# Patient Record
Sex: FEMALE | Race: WHITE | Employment: FULL TIME | URBAN - NONMETROPOLITAN AREA
[De-identification: names, ages, dates, MRNs, and addresses within clinical notes are randomized per-mention and may not be internally consistent; named-entity substitution may affect disease eponyms.]

---

## 2021-04-26 ENCOUNTER — TELEPHONE (OUTPATIENT)
Dept: OBGYN CLINIC | Age: 56
End: 2021-04-26

## 2021-04-26 ENCOUNTER — HOSPITAL ENCOUNTER (OUTPATIENT)
Age: 56
Setting detail: SPECIMEN
Discharge: HOME | End: 2021-04-26
Payer: COMMERCIAL

## 2021-04-26 ENCOUNTER — OFFICE VISIT (OUTPATIENT)
Dept: OBGYN CLINIC | Age: 56
End: 2021-04-26
Payer: COMMERCIAL

## 2021-04-26 VITALS
SYSTOLIC BLOOD PRESSURE: 140 MMHG | BODY MASS INDEX: 36.62 KG/M2 | WEIGHT: 199 LBS | DIASTOLIC BLOOD PRESSURE: 80 MMHG | HEIGHT: 62 IN

## 2021-04-26 DIAGNOSIS — N93.9 EPISODE OF HEAVY VAGINAL BLEEDING: ICD-10-CM

## 2021-04-26 DIAGNOSIS — N93.9 ABNORMAL UTERINE BLEEDING: Primary | ICD-10-CM

## 2021-04-26 DIAGNOSIS — N93.9 ABNORMAL UTERINE BLEEDING: ICD-10-CM

## 2021-04-26 LAB
ABSOLUTE EOS #: 0.37 K/UL (ref 0–0.44)
ABSOLUTE IMMATURE GRANULOCYTE: 0.03 K/UL (ref 0–0.3)
ABSOLUTE LYMPH #: 2.22 K/UL (ref 1.1–3.7)
ABSOLUTE MONO #: 0.79 K/UL (ref 0.1–1.2)
BASOPHILS # BLD: 1 % (ref 0–2)
BASOPHILS ABSOLUTE: 0.12 K/UL (ref 0–0.2)
DIFFERENTIAL TYPE: NORMAL
EOSINOPHILS RELATIVE PERCENT: 4 % (ref 1–4)
ESTRADIOL LEVEL: 30 PG/ML (ref 5–50)
FOLLICLE STIMULATING HORMONE: 17.2 U/L (ref 25.8–134.8)
HCT VFR BLD CALC: 43.5 % (ref 36.3–47.1)
HEMOGLOBIN: 14.2 G/DL (ref 11.9–15.1)
IMMATURE GRANULOCYTES: 0 %
LYMPHOCYTES # BLD: 26 % (ref 24–43)
MCH RBC QN AUTO: 29.8 PG (ref 25.2–33.5)
MCHC RBC AUTO-ENTMCNC: 32.6 G/DL (ref 28.4–34.8)
MCV RBC AUTO: 91.4 FL (ref 82.6–102.9)
MONOCYTES # BLD: 9 % (ref 3–12)
NRBC AUTOMATED: 0 PER 100 WBC
PDW BLD-RTO: 13.4 % (ref 11.8–14.4)
PLATELET # BLD: 365 K/UL (ref 138–453)
PLATELET ESTIMATE: NORMAL
PMV BLD AUTO: 9.7 FL (ref 8.1–13.5)
RBC # BLD: 4.76 M/UL (ref 3.95–5.11)
RBC # BLD: NORMAL 10*6/UL
SEG NEUTROPHILS: 60 % (ref 36–65)
SEGMENTED NEUTROPHILS ABSOLUTE COUNT: 5.06 K/UL (ref 1.5–8.1)
WBC # BLD: 8.6 K/UL (ref 3.5–11.3)
WBC # BLD: NORMAL 10*3/UL

## 2021-04-26 PROCEDURE — 58100 BIOPSY OF UTERUS LINING: CPT | Performed by: ADVANCED PRACTICE MIDWIFE

## 2021-04-26 PROCEDURE — 99214 OFFICE O/P EST MOD 30 MIN: CPT | Performed by: ADVANCED PRACTICE MIDWIFE

## 2021-04-26 ASSESSMENT — ENCOUNTER SYMPTOMS
RESPIRATORY NEGATIVE: 1
BACK PAIN: 1
ABDOMINAL PAIN: 1

## 2021-04-27 ENCOUNTER — OFFICE VISIT (OUTPATIENT)
Dept: OBGYN CLINIC | Age: 56
End: 2021-04-27
Payer: COMMERCIAL

## 2021-04-27 VITALS
DIASTOLIC BLOOD PRESSURE: 80 MMHG | WEIGHT: 199 LBS | BODY MASS INDEX: 36.62 KG/M2 | HEIGHT: 62 IN | SYSTOLIC BLOOD PRESSURE: 120 MMHG

## 2021-04-27 DIAGNOSIS — N93.9 ABNORMAL UTERINE AND VAGINAL BLEEDING, UNSPECIFIED: Primary | ICD-10-CM

## 2021-04-27 PROCEDURE — 99213 OFFICE O/P EST LOW 20 MIN: CPT | Performed by: ADVANCED PRACTICE MIDWIFE

## 2021-04-27 NOTE — PROGRESS NOTES
PROBLEM VISIT     Date of service: 2021    Indy Jimenez  Is a 63 y.o.  female    PT's PCP is: No primary care provider on file. : 1965                                          Review of Systems  Unchanged from yesterday 21 visit aside from vaginal bleeding has greatly improved and is scant now. No LMP recorded. OB History    Para Term  AB Living   2             SAB TAB Ectopic Molar Multiple Live Births             2      # Outcome Date GA Lbr Raciel/2nd Weight Sex Delivery Anes PTL Lv   2             1                  Social History     Tobacco Use   Smoking Status Never Smoker   Smokeless Tobacco Never Used        Social History     Substance and Sexual Activity   Alcohol Use Yes    Alcohol/week: 5.0 standard drinks    Types: 5 Glasses of wine per week       Allergies: Patient has no known allergies. Current Outpatient Medications:     norethindrone (AYGESTIN) 5 MG tablet, Take 2 tabs PO every hr until spotting (max of 6 doses) Then 2 tabs PO TID x2 days, 2 tabs PO BID x2 days, 2 tabs PO daily x 16 days, Disp: 64 tablet, Rfl: 0    Social History     Substance and Sexual Activity   Sexual Activity Yes    Partners: Male       Chief Complaint   Patient presents with    Follow-up     [facility] f/u for AUB. Pt denies any new concerns. Physical Exam  Constitutional:       Appearance: Normal appearance. She is normal weight. HENT:      Head: Normocephalic. Eyes:      Pupils: Pupils are equal, round, and reactive to light. Neck:      Musculoskeletal: Normal range of motion. Pulmonary:      Effort: Pulmonary effort is normal.   Musculoskeletal: Normal range of motion. Neurological:      Mental Status: She is alert and oriented to person, place, and time. Skin:     General: Skin is warm and dry. Psychiatric:         Behavior: Behavior normal.   Vitals signs and nursing note reviewed.          Vital Signs  Blood pressure 120/80, height 5'

## 2021-04-28 LAB — SURGICAL PATHOLOGY REPORT: NORMAL

## 2021-04-29 LAB
HPV SAMPLE: NORMAL
HPV, GENOTYPE 16: NOT DETECTED
HPV, GENOTYPE 18: NOT DETECTED
HPV, HIGH RISK OTHER: NOT DETECTED
HPV, INTERPRETATION: NORMAL
SPECIMEN DESCRIPTION: NORMAL

## 2021-04-30 LAB — CYTOLOGY REPORT: NORMAL

## 2021-05-25 ENCOUNTER — TELEPHONE (OUTPATIENT)
Dept: OBGYN CLINIC | Age: 56
End: 2021-05-25

## 2021-05-25 NOTE — TELEPHONE ENCOUNTER
Okay for another aygestin script. At last visit with me we had discussed that this was not PMB based on lab results - if normal biopsy and pap smear patient was going to use Aygestin and if bleeding returned was going to consider hyst due to fam hx of gyn cancer. Does not necessarily need appt early in June with  me - would she like to proceed with hysterectomy as we had discussed - if so then please schedule.

## 2021-05-25 NOTE — TELEPHONE ENCOUNTER
Spoke to patient and reviewed Teri's recommendations. Rx for Aygsestin e-prescribed to [facility] on 6th. She would like to keep her visit on 6/7 as she is also needing a repeat pap due to most recent pap being obscured by blood. She is wanting to discuss all of her options. She was leaning towards a hyst when all this started as she was concerned that she had cancer. She is reassured that her bx was normal and unsure of what she would like to do for the next step.

## 2021-06-07 ENCOUNTER — OFFICE VISIT (OUTPATIENT)
Dept: OBGYN CLINIC | Age: 56
End: 2021-06-07
Payer: COMMERCIAL

## 2021-06-07 ENCOUNTER — TELEPHONE (OUTPATIENT)
Dept: OBGYN CLINIC | Age: 56
End: 2021-06-07

## 2021-06-07 VITALS
BODY MASS INDEX: 37.1 KG/M2 | DIASTOLIC BLOOD PRESSURE: 80 MMHG | WEIGHT: 201.6 LBS | HEIGHT: 62 IN | SYSTOLIC BLOOD PRESSURE: 130 MMHG

## 2021-06-07 DIAGNOSIS — R87.615 PAP SMEAR OF CERVIX UNSATISFACTORY: ICD-10-CM

## 2021-06-07 DIAGNOSIS — Z01.419 SMEAR, VAGINAL, AS PART OF ROUTINE GYNECOLOGICAL EXAMINATION: Primary | ICD-10-CM

## 2021-06-07 DIAGNOSIS — Z12.72 SMEAR, VAGINAL, AS PART OF ROUTINE GYNECOLOGICAL EXAMINATION: Primary | ICD-10-CM

## 2021-06-07 PROCEDURE — 99396 PREV VISIT EST AGE 40-64: CPT | Performed by: ADVANCED PRACTICE MIDWIFE

## 2021-06-07 ASSESSMENT — ENCOUNTER SYMPTOMS
SHORTNESS OF BREATH: 0
RESPIRATORY NEGATIVE: 1
ABDOMINAL PAIN: 0
GASTROINTESTINAL NEGATIVE: 1
CONSTIPATION: 0
DIARRHEA: 0

## 2021-06-07 NOTE — PROGRESS NOTES
YEARLY PHYSICAL    Date of service: 2021    Genoveva Emanuel  Is a 63 y.o.   female    PT's PCP is: Naman Johnson     : 1965                                             Subjective:       Patient's last menstrual period was 2021 (approximate). Are your menses regular: Has been w/u for AUB and prolonged - improved with Aygestin but not a long term mgmt     OB History    Para Term  AB Living   2             SAB TAB Ectopic Molar Multiple Live Births             2      # Outcome Date GA Lbr Raciel/2nd Weight Sex Delivery Anes PTL Lv   2             1                  Social History     Tobacco Use   Smoking Status Never Smoker   Smokeless Tobacco Never Used        Social History     Substance and Sexual Activity   Alcohol Use Yes    Alcohol/week: 5.0 standard drinks    Types: 5 Glasses of wine per week       Family History   Problem Relation Age of Onset    Heart Disease Paternal Grandfather     Uterine Cancer Maternal Grandmother     Heart Disease Father        Any family history of breast or ovarian cancer: No    Any family history of blood clots: No      Allergies: Patient has no known allergies.       Current Outpatient Medications:     norethindrone (AYGESTIN) 5 MG tablet, Take 2 tabs PO every hr until spotting (max of 6 doses) Then 2 tabs PO TID x2 days, 2 tabs PO BID x2 days, 2 tabs PO daily x 16 days, Disp: 64 tablet, Rfl: 0    Social History     Substance and Sexual Activity   Sexual Activity Yes    Partners: Male       Any bleeding or pain with intercourse: No    Last pap: 2021 Unsat due to heavy bleeding    Last Mammogram: >12 months    Last Dexascan n/a    Last colorectal screen- type: Planning to have cologard with PCP    Do you do self breast exams: Yes    Past Medical History:   Diagnosis Date    Endometriosis        Past Surgical History:   Procedure Laterality Date    TONSILLECTOMY         Family History   Problem Relation Age of Onset    Heart Disease Paternal Grandfather     Uterine Cancer Maternal Grandmother     Heart Disease Father        Chief Complaint   Patient presents with    Follow-up     Pt here for repeat pap.  Vaginal Bleeding     Pt states when taking aygesting does not have bleeding but without them has bleeding. Labs:    No results found for this visit on 06/07/21. HPI: Denies breast/pelvic concerns aside from menses changes - ready to discuss long term mgmt. Monogamous relationship. Needs repeat pap    Review of Systems   Constitutional: Negative. Negative for chills, fatigue and fever. HENT: Negative. Respiratory: Negative. Negative for shortness of breath. Cardiovascular: Negative. Negative for chest pain. Gastrointestinal: Negative. Negative for abdominal pain, constipation and diarrhea. Genitourinary: Positive for menstrual problem. Negative for dysuria, enuresis, frequency, pelvic pain, urgency and vaginal bleeding. Musculoskeletal: Negative. Neurological: Negative. Negative for dizziness, light-headedness and headaches. Psychiatric/Behavioral: Negative. Objective  Blood pressure (!) 140/84, height 5' 2\" (1.575 m), weight 201 lb 9.6 oz (91.4 kg), last menstrual period 05/21/2021. Physical Exam  Constitutional:       Appearance: Normal appearance. She is normal weight. Genitourinary:      Pelvic exam was performed with patient in the lithotomy position. Vulva, urethra, vagina, cervix, uterus, right adnexa and left adnexa normal.      Uterus is anteverted and regular. HENT:      Head: Normocephalic. Eyes:      Pupils: Pupils are equal, round, and reactive to light. Cardiovascular:      Rate and Rhythm: Normal rate and regular rhythm. Pulses: Normal pulses. Heart sounds: Normal heart sounds. No murmur heard.      Pulmonary:      Effort: Pulmonary effort is normal.      Breath sounds: Normal breath sounds. No wheezing. Chest:      Breasts:         Right: Normal.         Left: Normal.      Comments: Dense breast tissue bilaterally and pendulous breasts  Abdominal:      Palpations: Abdomen is soft. Tenderness: There is no abdominal tenderness. Musculoskeletal:         General: Normal range of motion. Cervical back: Normal range of motion. No muscular tenderness. Neurological:      Mental Status: She is alert and oriented to person, place, and time. Skin:     General: Skin is warm and dry. Psychiatric:         Behavior: Behavior normal.   Vitals and nursing note reviewed. Chaperone present: Declined. Assessment and Plan          Diagnosis Orders   1. Smear, vaginal, as part of routine gynecological examination  PAP SMEAR   2. Pap smear of cervix unsatisfactory  PAP SMEAR     Repeat Annual every 1 year  Cervical Cytology Evaluation begins at 24years old. If Negative Cytology, Follow-up screening per current guidelines. Mammograms every 1year. If 37 yo and last mammogram was negative. Calcium and Vitamin D dosing reviewed. Colonoscopy screening reviewed as well as onset for bone density testing. Birth control and barrier recommendationsdiscussed. STD counseling and prevention reviewed. Gardisil counseling completed for all patients. Routine healthmaintenance per patients PCP. Discussed options for AUB - patient prefers ablation at this time, declines hyst.          I am having Milagros Copper maintain her norethindrone. Return in about 1 year (around 6/7/2022) for Yearly. She was also counseled on her preventative health maintenance recommendations and follow-up. There are no Patient Instructions on file for this visit.     MURRAY Duque CNM,6/7/2021 4:54 PM

## 2021-06-08 ENCOUNTER — HOSPITAL ENCOUNTER (OUTPATIENT)
Age: 56
Setting detail: SPECIMEN
Discharge: HOME | End: 2021-06-08
Payer: COMMERCIAL

## 2021-06-10 LAB — CYTOLOGY REPORT: NORMAL

## 2021-06-10 NOTE — TELEPHONE ENCOUNTER
Spoke to patient to schedule her procedure, we discussed surgery expectations and recovery. We discussed available dates, she needs to look at her calendar and will call with her decision. 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

## 2021-06-14 DIAGNOSIS — N93.9 EPISODE OF HEAVY VAGINAL BLEEDING: ICD-10-CM

## 2021-06-14 DIAGNOSIS — Z01.818 PRE-OP TESTING: ICD-10-CM

## 2021-06-14 DIAGNOSIS — N92.1 EXCESSIVE, FREQUENT AND IRREGULAR MENSTRUATION: ICD-10-CM

## 2021-06-14 DIAGNOSIS — N93.9 ABNORMAL UTERINE AND VAGINAL BLEEDING, UNSPECIFIED: Primary | ICD-10-CM

## 2021-06-14 NOTE — TELEPHONE ENCOUNTER
I attempted to call patient to confirm surgery details and had to leave a message. She is scheduled for a Hysteroscopy D&C, Thierry at [facility] on 7/23/21. She is aware that a nurse from [facility] will call her to complete a phone interview and arrange COVID testing. She will let me know if she needs a note for work. Patient will come in to see Dr. Ferdinand Aguilar prior to surgery and will get labs at that visit. She will also need an EKG and order is sent to [facility]. We will also follow up 4-6 weeks after surgery. Appointments scheduled. Patient verbalized understanding, no further questions/concerns voiced.

## 2021-06-14 NOTE — TELEPHONE ENCOUNTER
Patient left a message on the voicemail that she would like to go ahead with surgery on 7/23. Paperwork faxed, will await confirmation.

## 2021-06-24 ENCOUNTER — TELEPHONE (OUTPATIENT)
Dept: OBGYN CLINIC | Age: 56
End: 2021-06-24

## 2021-06-24 DIAGNOSIS — N93.9 ABNORMAL UTERINE AND VAGINAL BLEEDING, UNSPECIFIED: Primary | ICD-10-CM

## 2021-06-24 NOTE — TELEPHONE ENCOUNTER
Patient left a message on the voicemail that she finished her Aygestin a few days ago and has now started having heavy bleeding again. She is requesting another refill of Aygestin to last until her 7/23 surgery. Ok to call in another refill?

## 2021-06-24 NOTE — TELEPHONE ENCOUNTER
Spoke to patient, she is aware that the Rx was sent to the pharmacy. She was bleeding very heavy today and today is a little lighter. She may hold a day before starting the medication. She is aware that if she is still finishing up the Aygestin at the time of surgery or on her cycle, it will not alter her cycle. Patient verbalized understanding, no further questions/concerns voiced.

## 2021-07-07 ENCOUNTER — TELEPHONE (OUTPATIENT)
Dept: OBGYN CLINIC | Age: 56
End: 2021-07-07

## 2021-07-07 NOTE — TELEPHONE ENCOUNTER
Spoke to [place] at [place] regarding patient's upcoming procedure ([id number], [id number]). The procedure has been approved with a date range of 7/7/21-7/23/21 and auth number is [id number].

## 2021-07-21 ENCOUNTER — OFFICE VISIT (OUTPATIENT)
Dept: OBGYN CLINIC | Age: 56
End: 2021-07-21
Payer: COMMERCIAL

## 2021-07-21 ENCOUNTER — HOSPITAL ENCOUNTER (OUTPATIENT)
Age: 56
Setting detail: SPECIMEN
Discharge: HOME | End: 2021-07-21
Payer: COMMERCIAL

## 2021-07-21 VITALS
BODY MASS INDEX: 36.8 KG/M2 | WEIGHT: 200 LBS | SYSTOLIC BLOOD PRESSURE: 128 MMHG | DIASTOLIC BLOOD PRESSURE: 88 MMHG | HEIGHT: 62 IN

## 2021-07-21 DIAGNOSIS — N93.9 ABNORMAL UTERINE AND VAGINAL BLEEDING, UNSPECIFIED: ICD-10-CM

## 2021-07-21 DIAGNOSIS — N93.9 EPISODE OF HEAVY VAGINAL BLEEDING: ICD-10-CM

## 2021-07-21 DIAGNOSIS — N92.1 EXCESSIVE, FREQUENT AND IRREGULAR MENSTRUATION: ICD-10-CM

## 2021-07-21 DIAGNOSIS — Z01.818 PRE-OP TESTING: ICD-10-CM

## 2021-07-21 DIAGNOSIS — N92.1 MENORRHAGIA WITH IRREGULAR CYCLE: Primary | ICD-10-CM

## 2021-07-21 PROCEDURE — 99213 OFFICE O/P EST LOW 20 MIN: CPT | Performed by: OBSTETRICS & GYNECOLOGY

## 2021-07-21 PROCEDURE — 93000 ELECTROCARDIOGRAM COMPLETE: CPT | Performed by: OBSTETRICS & GYNECOLOGY

## 2021-07-21 NOTE — PROGRESS NOTES
DATE OF VISIT:  7/21/21    PATIENT NAME:  Sushila Weaver     YOB: 1965    REASON FOR VISIT:    Chief Complaint   Patient presents with    Pre-op Exam     pt. is scheduled on 7- for hyst D&C, novasure ablation.  Irregular Menses     Pt. is using aygestin to supress her cycle. Prior to that she was saturating  a pad an hour. HISTORY OF PRESENT ILLNESS:  Pt was having heavy menses this spring and previously had thought she was menopausal - labs showed that she is not - using aygestin to control bleeding now - wants surgical intervention; disc'd hysteroscopy d&c novasure r/b/a disc'd consent obtained      No LMP recorded (approximate). Vitals:    07/21/21 1438   BP: 128/88   Site: Right Upper Arm   Position: Sitting   Weight: 200 lb (90.7 kg)   Height: 5' 2\" (1.575 m)     Body mass index is 36.58 kg/m². Allergies   Allergen Reactions    Tree Nut [Macadamia Nut Oil]      nithin     Current Outpatient Medications   Medication Sig Dispense Refill    norethindrone (AYGESTIN) 5 MG tablet Take 2 tabs PO every hr until spotting (max of 6 doses) Then 2 tabs PO TID x2 days, 2 tabs PO BID x2 days, 2 tabs PO daily x 16 days 64 tablet 0     No current facility-administered medications for this visit. Social History     Socioeconomic History    Marital status: Single     Spouse name: Not on file    Number of children: Not on file    Years of education: Not on file    Highest education level: Not on file   Occupational History    Not on file   Tobacco Use    Smoking status: Never Smoker    Smokeless tobacco: Never Used   Vaping Use    Vaping Use: Never used   Substance and Sexual Activity    Alcohol use:  Yes     Alcohol/week: 5.0 standard drinks     Types: 5 Glasses of wine per week    Drug use: Never    Sexual activity: Yes     Partners: Male   Other Topics Concern    Not on file   Social History Narrative    Not on file     Social Determinants of Health     Financial Resource Strain:     Difficulty of Paying Living Expenses:    Food Insecurity:     Worried About Running Out of Food in the Last Year:     [address] in the Last Year:    Transportation Needs:     Lack of Transportation (Medical):  Lack of Transportation (Non-Medical):    Physical Activity:     Days of Exercise per Week:     Minutes of Exercise per Session:    Stress:     Feeling of Stress :    Social Connections:     Frequency of Communication with Friends and Family:     Frequency of Social Gatherings with Friends and Family:     Attends Caoda[employer] Services:     Active Member of Clubs or Organizations:     Attends Club or Organization Meetings:     Marital Status:    Intimate Partner Violence:     Fear of Current or Ex-Partner:     Emotionally Abused:     Physically Abused:     Sexually Abused:        REVIEW OF SYSTEMS:  Review of Systems   Constitutional: Negative for chills and fever. Genitourinary: Positive for menstrual problem. Negative for pelvic pain and vaginal discharge. PHYSICAL EXAM:  /88 (Site: Right Upper Arm, Position: Sitting)   Ht 5' 2\" (1.575 m)   Wt 200 lb (90.7 kg)   LMP  (Approximate) Comment: LMP approx 1 month ago. She is on aygestin to supress  BMI 36.58 kg/m²   Physical Exam  Constitutional:       Appearance: Normal appearance. HENT:      Head: Normocephalic and atraumatic. Eyes:      Extraocular Movements: Extraocular movements intact. Pupils: Pupils are equal, round, and reactive to light. Cardiovascular:      Rate and Rhythm: Normal rate. Pulmonary:      Effort: Pulmonary effort is normal.   Musculoskeletal:         General: Normal range of motion. Cervical back: Normal range of motion. Neurological:      Mental Status: She is alert and oriented to person, place, and time. Skin:     General: Skin is warm and dry.    Psychiatric:         Mood and Affect: Mood normal.         Behavior: Behavior normal.         Thought Content: Thought content normal.         Judgment: Judgment normal.     The patient, Sushila Weaver is a 63 y.o. female, was seen with a total time spent of 20 minutes for the visit on this date of service by the E/M provider. The time component had both face to face and non face to face time spent in determining the total time component. Counseling and education regarding her diagnosis listed below and her options regarding those diagnoses were also included in determining her time component. Diagnosis Orders   1. Menorrhagia with irregular cycle          The patient had her preventative health maintenance recommendations and follow-up reviewed with her at the completion of her visit. ASSESSMENT:      1. Menorrhagia with irregular cycle        PLAN:  No orders of the defined types were placed in this encounter. Return in about 1 week (around 7/28/2021) for hysteroscopy d&c nehemias.        Electronically signed by Roberto Fung DO on 07/21/21

## 2021-07-22 ENCOUNTER — ANESTHESIA EVENT (OUTPATIENT)
Dept: OPERATING ROOM | Age: 56
End: 2021-07-22
Payer: COMMERCIAL

## 2021-07-22 LAB
ABSOLUTE EOS #: 0.4 K/UL (ref 0–0.44)
ABSOLUTE IMMATURE GRANULOCYTE: 0.04 K/UL (ref 0–0.3)
ABSOLUTE LYMPH #: 2.58 K/UL (ref 1.1–3.7)
ABSOLUTE MONO #: 0.89 K/UL (ref 0.1–1.2)
ALBUMIN SERPL-MCNC: 3.7 G/DL (ref 3.5–5.2)
ALBUMIN/GLOBULIN RATIO: 1.2 (ref 1–2.5)
ALP BLD-CCNC: 78 U/L (ref 35–104)
ALT SERPL-CCNC: 39 U/L (ref 5–33)
ANION GAP SERPL CALCULATED.3IONS-SCNC: 13 MMOL/L (ref 9–17)
AST SERPL-CCNC: 32 U/L
BASOPHILS # BLD: 1 % (ref 0–2)
BASOPHILS ABSOLUTE: 0.09 K/UL (ref 0–0.2)
BILIRUB SERPL-MCNC: 0.21 MG/DL (ref 0.3–1.2)
BUN BLDV-MCNC: 13 MG/DL (ref 6–20)
BUN/CREAT BLD: ABNORMAL (ref 9–20)
CALCIUM SERPL-MCNC: 8.4 MG/DL (ref 8.6–10.4)
CHLORIDE BLD-SCNC: 104 MMOL/L (ref 98–107)
CO2: 22 MMOL/L (ref 20–31)
CREAT SERPL-MCNC: 0.77 MG/DL (ref 0.5–0.9)
DIFFERENTIAL TYPE: NORMAL
EOSINOPHILS RELATIVE PERCENT: 4 % (ref 1–4)
GFR AFRICAN AMERICAN: >60 ML/MIN
GFR NON-AFRICAN AMERICAN: >60 ML/MIN
GFR SERPL CREATININE-BSD FRML MDRD: ABNORMAL ML/MIN/{1.73_M2}
GFR SERPL CREATININE-BSD FRML MDRD: ABNORMAL ML/MIN/{1.73_M2}
GLUCOSE BLD-MCNC: 89 MG/DL (ref 70–99)
HCG QUALITATIVE: NEGATIVE
HCT VFR BLD CALC: 43.6 % (ref 36.3–47.1)
HEMOGLOBIN: 13.8 G/DL (ref 11.9–15.1)
IMMATURE GRANULOCYTES: 0 %
LYMPHOCYTES # BLD: 27 % (ref 24–43)
MCH RBC QN AUTO: 30.1 PG (ref 25.2–33.5)
MCHC RBC AUTO-ENTMCNC: 31.7 G/DL (ref 28.4–34.8)
MCV RBC AUTO: 95.2 FL (ref 82.6–102.9)
MONOCYTES # BLD: 9 % (ref 3–12)
NRBC AUTOMATED: 0 PER 100 WBC
PDW BLD-RTO: 13.9 % (ref 11.8–14.4)
PLATELET # BLD: 414 K/UL (ref 138–453)
PLATELET ESTIMATE: NORMAL
PMV BLD AUTO: 9.6 FL (ref 8.1–13.5)
POTASSIUM SERPL-SCNC: 4.2 MMOL/L (ref 3.7–5.3)
RBC # BLD: 4.58 M/UL (ref 3.95–5.11)
RBC # BLD: NORMAL 10*6/UL
SEG NEUTROPHILS: 59 % (ref 36–65)
SEGMENTED NEUTROPHILS ABSOLUTE COUNT: 5.42 K/UL (ref 1.5–8.1)
SODIUM BLD-SCNC: 139 MMOL/L (ref 135–144)
TOTAL PROTEIN: 6.7 G/DL (ref 6.4–8.3)
WBC # BLD: 9.4 K/UL (ref 3.5–11.3)
WBC # BLD: NORMAL 10*3/UL

## 2021-07-23 ENCOUNTER — HOSPITAL ENCOUNTER (OUTPATIENT)
Age: 56
Setting detail: OUTPATIENT SURGERY
Discharge: HOME | End: 2021-07-23
Attending: OBSTETRICS & GYNECOLOGY | Admitting: OBSTETRICS & GYNECOLOGY
Payer: COMMERCIAL

## 2021-07-23 ENCOUNTER — ANESTHESIA (OUTPATIENT)
Dept: OPERATING ROOM | Age: 56
End: 2021-07-23
Payer: COMMERCIAL

## 2021-07-23 VITALS — SYSTOLIC BLOOD PRESSURE: 131 MMHG | OXYGEN SATURATION: 98 % | DIASTOLIC BLOOD PRESSURE: 76 MMHG

## 2021-07-23 VITALS
HEART RATE: 70 BPM | TEMPERATURE: 97.1 F | BODY MASS INDEX: 36.84 KG/M2 | HEIGHT: 62 IN | OXYGEN SATURATION: 99 % | SYSTOLIC BLOOD PRESSURE: 161 MMHG | RESPIRATION RATE: 18 BRPM | DIASTOLIC BLOOD PRESSURE: 89 MMHG | WEIGHT: 200.2 LBS

## 2021-07-23 DIAGNOSIS — G89.18 POST-OP PAIN: Primary | ICD-10-CM

## 2021-07-23 PROCEDURE — 88305 TISSUE EXAM BY PATHOLOGIST: CPT

## 2021-07-23 PROCEDURE — 6360000002 HC RX W HCPCS: Performed by: NURSE ANESTHETIST, CERTIFIED REGISTERED

## 2021-07-23 PROCEDURE — 58563 HYSTEROSCOPY ABLATION: CPT | Performed by: OBSTETRICS & GYNECOLOGY

## 2021-07-23 PROCEDURE — 2709999900 HC NON-CHARGEABLE SUPPLY: Performed by: OBSTETRICS & GYNECOLOGY

## 2021-07-23 PROCEDURE — 3600000003 HC SURGERY LEVEL 3 BASE: Performed by: OBSTETRICS & GYNECOLOGY

## 2021-07-23 PROCEDURE — 7100000000 HC PACU RECOVERY - FIRST 15 MIN: Performed by: OBSTETRICS & GYNECOLOGY

## 2021-07-23 PROCEDURE — 2720000010 HC SURG SUPPLY STERILE: Performed by: OBSTETRICS & GYNECOLOGY

## 2021-07-23 PROCEDURE — 7100000001 HC PACU RECOVERY - ADDTL 15 MIN: Performed by: OBSTETRICS & GYNECOLOGY

## 2021-07-23 PROCEDURE — 7100000011 HC PHASE II RECOVERY - ADDTL 15 MIN: Performed by: OBSTETRICS & GYNECOLOGY

## 2021-07-23 PROCEDURE — 2500000003 HC RX 250 WO HCPCS: Performed by: NURSE ANESTHETIST, CERTIFIED REGISTERED

## 2021-07-23 PROCEDURE — 7100000010 HC PHASE II RECOVERY - FIRST 15 MIN: Performed by: OBSTETRICS & GYNECOLOGY

## 2021-07-23 PROCEDURE — 3600000013 HC SURGERY LEVEL 3 ADDTL 15MIN: Performed by: OBSTETRICS & GYNECOLOGY

## 2021-07-23 PROCEDURE — 2580000003 HC RX 258: Performed by: OBSTETRICS & GYNECOLOGY

## 2021-07-23 PROCEDURE — 3700000001 HC ADD 15 MINUTES (ANESTHESIA): Performed by: OBSTETRICS & GYNECOLOGY

## 2021-07-23 PROCEDURE — 6370000000 HC RX 637 (ALT 250 FOR IP): Performed by: NURSE ANESTHETIST, CERTIFIED REGISTERED

## 2021-07-23 PROCEDURE — 6370000000 HC RX 637 (ALT 250 FOR IP): Performed by: OBSTETRICS & GYNECOLOGY

## 2021-07-23 PROCEDURE — 3700000000 HC ANESTHESIA ATTENDED CARE: Performed by: OBSTETRICS & GYNECOLOGY

## 2021-07-23 RX ORDER — SODIUM CHLORIDE, SODIUM LACTATE, POTASSIUM CHLORIDE, CALCIUM CHLORIDE 600; 310; 30; 20 MG/100ML; MG/100ML; MG/100ML; MG/100ML
INJECTION, SOLUTION INTRAVENOUS CONTINUOUS
Status: DISCONTINUED | OUTPATIENT
Start: 2021-07-23 | End: 2021-07-23 | Stop reason: HOSPADM

## 2021-07-23 RX ORDER — FENTANYL CITRATE 50 UG/ML
INJECTION, SOLUTION INTRAMUSCULAR; INTRAVENOUS PRN
Status: DISCONTINUED | OUTPATIENT
Start: 2021-07-23 | End: 2021-07-23 | Stop reason: SDUPTHER

## 2021-07-23 RX ORDER — HYDROCODONE BITARTRATE AND ACETAMINOPHEN 5; 325 MG/1; MG/1
1 TABLET ORAL
Status: DISCONTINUED | OUTPATIENT
Start: 2021-07-23 | End: 2021-07-23 | Stop reason: HOSPADM

## 2021-07-23 RX ORDER — PROPOFOL 10 MG/ML
INJECTION, EMULSION INTRAVENOUS CONTINUOUS PRN
Status: DISCONTINUED | OUTPATIENT
Start: 2021-07-23 | End: 2021-07-23 | Stop reason: SDUPTHER

## 2021-07-23 RX ORDER — KETOROLAC TROMETHAMINE 10 MG/1
10 TABLET, FILM COATED ORAL EVERY 6 HOURS PRN
Qty: 12 TABLET | Refills: 0 | Status: SHIPPED | OUTPATIENT
Start: 2021-07-23

## 2021-07-23 RX ORDER — HYDROCODONE BITARTRATE AND ACETAMINOPHEN 5; 325 MG/1; MG/1
1 TABLET ORAL EVERY 4 HOURS PRN
Qty: 12 TABLET | Refills: 0 | Status: SHIPPED | OUTPATIENT
Start: 2021-07-23 | End: 2021-07-26

## 2021-07-23 RX ORDER — KETOROLAC TROMETHAMINE 15 MG/ML
INJECTION, SOLUTION INTRAMUSCULAR; INTRAVENOUS PRN
Status: DISCONTINUED | OUTPATIENT
Start: 2021-07-23 | End: 2021-07-23 | Stop reason: SDUPTHER

## 2021-07-23 RX ORDER — ATROPA BELLADONNA AND OPIUM 16.2; 3 MG/1; MG/1
SUPPOSITORY RECTAL PRN
Status: DISCONTINUED | OUTPATIENT
Start: 2021-07-23 | End: 2021-07-23 | Stop reason: SDUPTHER

## 2021-07-23 RX ORDER — FENTANYL CITRATE 50 UG/ML
50 INJECTION, SOLUTION INTRAMUSCULAR; INTRAVENOUS EVERY 5 MIN PRN
Status: DISCONTINUED | OUTPATIENT
Start: 2021-07-23 | End: 2021-07-23 | Stop reason: HOSPADM

## 2021-07-23 RX ORDER — ACETAMINOPHEN 325 MG/1
650 TABLET ORAL ONCE
Status: COMPLETED | OUTPATIENT
Start: 2021-07-23 | End: 2021-07-23

## 2021-07-23 RX ORDER — DEXAMETHASONE SODIUM PHOSPHATE 4 MG/ML
4 INJECTION, SOLUTION INTRA-ARTICULAR; INTRALESIONAL; INTRAMUSCULAR; INTRAVENOUS; SOFT TISSUE
Status: DISCONTINUED | OUTPATIENT
Start: 2021-07-23 | End: 2021-07-23 | Stop reason: HOSPADM

## 2021-07-23 RX ORDER — LIDOCAINE HYDROCHLORIDE 10 MG/ML
INJECTION, SOLUTION INFILTRATION; PERINEURAL PRN
Status: DISCONTINUED | OUTPATIENT
Start: 2021-07-23 | End: 2021-07-23 | Stop reason: SDUPTHER

## 2021-07-23 RX ORDER — ONDANSETRON 2 MG/ML
4 INJECTION INTRAMUSCULAR; INTRAVENOUS
Status: DISCONTINUED | OUTPATIENT
Start: 2021-07-23 | End: 2021-07-23 | Stop reason: HOSPADM

## 2021-07-23 RX ADMIN — ACETAMINOPHEN 650 MG: 325 TABLET ORAL at 07:21

## 2021-07-23 RX ADMIN — SODIUM CHLORIDE, POTASSIUM CHLORIDE, SODIUM LACTATE AND CALCIUM CHLORIDE: 600; 310; 30; 20 INJECTION, SOLUTION INTRAVENOUS at 07:30

## 2021-07-23 RX ADMIN — KETOROLAC TROMETHAMINE 30 MG: 15 INJECTION, SOLUTION INTRAMUSCULAR; INTRAVENOUS at 09:03

## 2021-07-23 RX ADMIN — LIDOCAINE HYDROCHLORIDE 25 MG: 10 INJECTION, SOLUTION INFILTRATION; PERINEURAL at 08:56

## 2021-07-23 RX ADMIN — LIDOCAINE HYDROCHLORIDE 50 MG: 10 INJECTION, SOLUTION INFILTRATION; PERINEURAL at 08:44

## 2021-07-23 RX ADMIN — ATROPA BELLADONNA AND OPIUM 1 MG: 16.2; 3 SUPPOSITORY RECTAL at 08:53

## 2021-07-23 RX ADMIN — PROPOFOL 150 MCG/KG/MIN: 10 INJECTION, EMULSION INTRAVENOUS at 08:44

## 2021-07-23 RX ADMIN — LIDOCAINE HYDROCHLORIDE 25 MG: 10 INJECTION, SOLUTION INFILTRATION; PERINEURAL at 09:04

## 2021-07-23 RX ADMIN — FENTANYL CITRATE 50 MCG: 50 INJECTION, SOLUTION INTRAMUSCULAR; INTRAVENOUS at 08:44

## 2021-07-23 ASSESSMENT — PAIN DESCRIPTION - PAIN TYPE: TYPE: SURGICAL PAIN

## 2021-07-23 ASSESSMENT — PAIN DESCRIPTION - LOCATION: LOCATION: ABDOMEN

## 2021-07-23 ASSESSMENT — PAIN DESCRIPTION - DESCRIPTORS: DESCRIPTORS: CRAMPING

## 2021-07-23 ASSESSMENT — PAIN - FUNCTIONAL ASSESSMENT: PAIN_FUNCTIONAL_ASSESSMENT: 0-10

## 2021-07-23 ASSESSMENT — PAIN SCALES - GENERAL
PAINLEVEL_OUTOF10: 0
PAINLEVEL_OUTOF10: 3

## 2021-07-23 NOTE — PROGRESS NOTES

## 2021-07-23 NOTE — H&P
HISTORY AND PHYSICAL             Date: 7/23/2021        Patient Name: Rehana Becerra     YOB: 1965      Age:  63 y.o. Chief Complaint   No chief complaint on file. History Obtained From   patient    History of Present Illness   Pt with heavy menses    Past Medical History     Past Medical History:   Diagnosis Date    Endometriosis         Past Surgical History     Past Surgical History:   Procedure Laterality Date    TONSILLECTOMY          Medications Prior to Admission     Prior to Admission medications    Medication Sig Start Date End Date Taking? Authorizing Provider   norethindrone (AYGESTIN) 5 MG tablet Take 2 tabs PO every hr until spotting (max of 6 doses) Then 2 tabs PO TID x2 days, 2 tabs PO BID x2 days, 2 tabs PO daily x 16 days 6/24/21  Yes MURRAY De Leon CNM        Allergies   Tree nut [macadamia nut oil]    Social History     Social History     Tobacco History     Smoking Status  Never Smoker    Smokeless Tobacco Use  Never Used          Alcohol History     Alcohol Use Status  Yes Drinks/Week  5 Glasses of wine per week Amount  5.0 standard drinks of alcohol/wk          Drug Use     Drug Use Status  Never          Sexual Activity     Sexually Active  Yes Partners  Male                Family History     Family History   Problem Relation Age of Onset    Heart Disease Paternal Grandfather     Uterine Cancer Maternal Grandmother     Heart Disease Father        Review of Systems   Review of Systems   Constitutional: Negative for chills and fever. Genitourinary: Positive for menstrual problem. Negative for dysuria and vaginal discharge. Physical Exam   BP (!) 155/77   Pulse 72   Temp 97.2 °F (36.2 °C) (Temporal)   Resp 15   Ht 5' 2\" (1.575 m)   Wt 200 lb 3.2 oz (90.8 kg)   SpO2 99%   BMI 36.62 kg/m²     Physical Exam  Constitutional:       Appearance: Normal appearance. HENT:      Head: Normocephalic and atraumatic.    Eyes:      Extraocular Movements: Extraocular movements intact. Pupils: Pupils are equal, round, and reactive to light. Cardiovascular:      Rate and Rhythm: Normal rate. Pulmonary:      Effort: Pulmonary effort is normal.   Musculoskeletal:         General: Normal range of motion. Cervical back: Normal range of motion. Skin:     General: Skin is warm and dry. Neurological:      Mental Status: She is alert and oriented to person, place, and time. Psychiatric:         Mood and Affect: Mood normal.         Behavior: Behavior normal.         Thought Content: Thought content normal.         Judgment: Judgment normal.         Labs    No results found for this or any previous visit (from the past 24 hour(s)). Imaging/Diagnostics Last 24 Hours   No results found. Assessment      Menorrhagia  Plan   1.  Hysteroscopy d&c Thierry    Consultations Ordered:  None    Electronically signed by Sharri Cao DO on 7/23/21 at 7:45 AM EDT

## 2021-07-23 NOTE — ANESTHESIA PRE PROCEDURE
Department of Anesthesiology  Preprocedure Note       Name:  Maikel Anguiano   Age:  63 y.o.  :  1965                                          MRN:  [id number]         Date:  2021      Surgeon: Candido Garcia):  Ana Skaggs DO    Procedure: Procedure(s):  DILATATION AND CURETTAGE HYSTEROSCOPY CAUTERY ABLATION NOVASURE    Medications prior to admission:   Prior to Admission medications    Medication Sig Start Date End Date Taking? Authorizing Provider   norethindrone (AYGESTIN) 5 MG tablet Take 2 tabs PO every hr until spotting (max of 6 doses) Then 2 tabs PO TID x2 days, 2 tabs PO BID x2 days, 2 tabs PO daily x 16 days 21  Yes MURRAY De Leon CNM       Current medications:    Current Facility-Administered Medications   Medication Dose Route Frequency Provider Last Rate Last Admin    lactated ringers infusion   Intravenous Continuous Ana Skaggs  mL/hr at 21 0730 New Bag at 21 0730       Allergies: Allergies   Allergen Reactions    Tree Nut [Macadamia Nut Oil]      cashews       Problem List:  There is no problem list on file for this patient. Past Medical History:        Diagnosis Date    Endometriosis        Past Surgical History:        Procedure Laterality Date    TONSILLECTOMY         Social History:    Social History     Tobacco Use    Smoking status: Never Smoker    Smokeless tobacco: Never Used   Substance Use Topics    Alcohol use:  Yes     Alcohol/week: 5.0 standard drinks     Types: 5 Glasses of wine per week                                Counseling given: Not Answered      Vital Signs (Current):   Vitals:    21 1656 21 0701 21 0713 21 0730   BP:   (!) 161/76 (!) 155/77   Pulse:   72    Resp:   15    Temp:   36.2 °C (97.2 °F)    TempSrc:   Temporal    SpO2:   99%    Weight: 201 lb (91.2 kg) 200 lb 3.2 oz (90.8 kg)     Height: 5' 2\" (1.575 m)  5' 2\" (1.575 m) BP Readings from Last 3 Encounters:   07/23/21 (!) 155/77   07/21/21 128/88   06/07/21 130/80       NPO Status: Time of last liquid consumption: 2300                        Time of last solid consumption: 2100                        Date of last liquid consumption: 07/22/21                        Date of last solid food consumption: 07/22/21    BMI:   Wt Readings from Last 3 Encounters:   07/23/21 200 lb 3.2 oz (90.8 kg)   07/21/21 200 lb (90.7 kg)   06/07/21 201 lb 9.6 oz (91.4 kg)     Body mass index is 36.62 kg/m². CBC:   Lab Results   Component Value Date    WBC 9.4 07/21/2021    RBC 4.58 07/21/2021    HGB 13.8 07/21/2021    HCT 43.6 07/21/2021    MCV 95.2 07/21/2021    RDW 13.9 07/21/2021     07/21/2021       CMP:   Lab Results   Component Value Date     07/21/2021    K 4.2 07/21/2021     07/21/2021    CO2 22 07/21/2021    BUN 13 07/21/2021    CREATININE 0.77 07/21/2021    GFRAA >60 07/21/2021    LABGLOM >60 07/21/2021    GLUCOSE 89 07/21/2021    PROT 6.7 07/21/2021    CALCIUM 8.4 07/21/2021    BILITOT 0.21 07/21/2021    ALKPHOS 78 07/21/2021    AST 32 07/21/2021    ALT 39 07/21/2021       POC Tests: No results for input(s): POCGLU, POCNA, POCK, POCCL, POCBUN, POCHEMO, POCHCT in the last 72 hours.     Coags: No results found for: PROTIME, INR, APTT    HCG (If Applicable): No results found for: PREGTESTUR, PREGSERUM, HCG, HCGQUANT     ABGs: No results found for: PHART, PO2ART, SMS8WDO, LQK0VAG, BEART, S0DZYARK     Type & Screen (If Applicable):  No results found for: LABABO, LABRH    Drug/Infectious Status (If Applicable):  No results found for: HIV, HEPCAB    COVID-19 Screening (If Applicable): No results found for: COVID19        Anesthesia Evaluation  Patient summary reviewed and Nursing notes reviewed no history of anesthetic complications:   Airway: Mallampati: II  TM distance: >3 FB   Neck ROM: full  Mouth opening: > = 3 FB Dental: normal exam         Pulmonary:Negative Pulmonary ROS and normal exam  breath sounds clear to auscultation                             Cardiovascular:Negative CV ROS  Exercise tolerance: good (>4 METS),           Rhythm: regular  Rate: normal           Beta Blocker:  Not on Beta Blocker         Neuro/Psych:   Negative Neuro/Psych ROS              GI/Hepatic/Renal: Neg GI/Hepatic/Renal ROS            Endo/Other: Negative Endo/Other ROS                    Abdominal:             Vascular: negative vascular ROS. Other Findings:             Anesthesia Plan      general     ASA 1       Induction: intravenous. MIPS: Postoperative opioids intended and Prophylactic antiemetics administered. Anesthetic plan and risks discussed with patient. Plan discussed with CRNA.                   MURRAY Choe - CRNA   7/23/2021

## 2021-07-23 NOTE — ANESTHESIA POSTPROCEDURE EVALUATION
Department of Anesthesiology  Postprocedure Note    Patient: Estephnaie Torres  MRN: [id number]  YOB: 1965  Date of evaluation: 7/23/2021  Time:  9:20 AM     Procedure Summary     Date: 07/23/21 Room / Location: [address]    Anesthesia Start: 1359 Anesthesia Stop: 4638    Procedure: DILATATION AND CURETTAGE HYSTEROSCOPY CAUTERY ABLATION Huy Santana (N/A ) Diagnosis: (MENORRHAGIA, AUB)    Surgeons: Curtsi Goldstein DO Responsible Provider: MURRAY Ho CRNA    Anesthesia Type: general, TIVA ASA Status: 1          Anesthesia Type: general, TIVA    Felix Phase I: Felix Score: 9    Felix Phase II:      Last vitals: Reviewed and per EMR flowsheets.        Anesthesia Post Evaluation    Patient location during evaluation: PACU  Patient participation: complete - patient participated  Level of consciousness: awake and alert  Pain score: 0  Airway patency: patent  Nausea & Vomiting: no nausea and no vomiting  Complications: no  Cardiovascular status: blood pressure returned to baseline and hemodynamically stable  Respiratory status: acceptable, spontaneous ventilation and room air  Hydration status: euvolemic

## 2021-07-23 NOTE — ANESTHESIA PRE PROCEDURE
Department of Anesthesiology  Preprocedure Note       Name:  Bryant Hamilton   Age:  63 y.o.  :  1965                                          MRN:  [id number]         Date:  2021      Surgeon: Christian Wade):  Eduardo Crouch DO    Procedure: Procedure(s):  DILATATION AND CURETTAGE HYSTEROSCOPY CAUTERY ABLATION NOVASURE    Medications prior to admission:   Prior to Admission medications    Medication Sig Start Date End Date Taking? Authorizing Provider   norethindrone (AYGESTIN) 5 MG tablet Take 2 tabs PO every hr until spotting (max of 6 doses) Then 2 tabs PO TID x2 days, 2 tabs PO BID x2 days, 2 tabs PO daily x 16 days 21  Yes MURRAY De Leon CNM       Current medications:    Current Facility-Administered Medications   Medication Dose Route Frequency Provider Last Rate Last Admin    lactated ringers infusion   Intravenous Continuous Eduardo Minium,  mL/hr at 21 0730 New Bag at 21 0730       Allergies: Allergies   Allergen Reactions    Tree Nut [Macadamia Nut Oil]      cashChirpify       Problem List:  There is no problem list on file for this patient. Past Medical History:        Diagnosis Date    Endometriosis        Past Surgical History:        Procedure Laterality Date    TONSILLECTOMY         Social History:    Social History     Tobacco Use    Smoking status: Never Smoker    Smokeless tobacco: Never Used   Substance Use Topics    Alcohol use:  Yes     Alcohol/week: 5.0 standard drinks     Types: 5 Glasses of wine per week                                Counseling given: Not Answered      Vital Signs (Current):   Vitals:    21 1656 21 0701 21 0713 21 0730   BP:   (!) 161/76 (!) 155/77   Pulse:   72    Resp:   15    Temp:   36.2 °C (97.2 °F)    TempSrc:   Temporal    SpO2:   99%    Weight: 201 lb (91.2 kg) 200 lb 3.2 oz (90.8 kg)     Height: 5' 2\" (1.575 m)  5' 2\" (1.575 m) BP Readings from Last 3 Encounters:   07/23/21 (!) 155/77   07/21/21 128/88   06/07/21 130/80       NPO Status: Time of last liquid consumption: 2300                        Time of last solid consumption: 2100                        Date of last liquid consumption: 07/22/21                        Date of last solid food consumption: 07/22/21    BMI:   Wt Readings from Last 3 Encounters:   07/23/21 200 lb 3.2 oz (90.8 kg)   07/21/21 200 lb (90.7 kg)   06/07/21 201 lb 9.6 oz (91.4 kg)     Body mass index is 36.62 kg/m². CBC:   Lab Results   Component Value Date    WBC 9.4 07/21/2021    RBC 4.58 07/21/2021    HGB 13.8 07/21/2021    HCT 43.6 07/21/2021    MCV 95.2 07/21/2021    RDW 13.9 07/21/2021     07/21/2021       CMP:   Lab Results   Component Value Date     07/21/2021    K 4.2 07/21/2021     07/21/2021    CO2 22 07/21/2021    BUN 13 07/21/2021    CREATININE 0.77 07/21/2021    GFRAA >60 07/21/2021    LABGLOM >60 07/21/2021    GLUCOSE 89 07/21/2021    PROT 6.7 07/21/2021    CALCIUM 8.4 07/21/2021    BILITOT 0.21 07/21/2021    ALKPHOS 78 07/21/2021    AST 32 07/21/2021    ALT 39 07/21/2021       POC Tests: No results for input(s): POCGLU, POCNA, POCK, POCCL, POCBUN, POCHEMO, POCHCT in the last 72 hours.     Coags: No results found for: PROTIME, INR, APTT    HCG (If Applicable): No results found for: PREGTESTUR, PREGSERUM, HCG, HCGQUANT     ABGs: No results found for: PHART, PO2ART, MIW7CFV, QGK6IJR, BEART, O2FMTSNO     Type & Screen (If Applicable):  No results found for: LABABO, LABRH    Drug/Infectious Status (If Applicable):  No results found for: HIV, HEPCAB    COVID-19 Screening (If Applicable): No results found for: COVID19        Anesthesia Evaluation  Patient summary reviewed and Nursing notes reviewed no history of anesthetic complications:   Airway: Mallampati: I  TM distance: >3 FB   Neck ROM: full  Mouth opening: > = 3 FB Dental: normal exam         Pulmonary:Negative Pulmonary ROS and normal exam                               Cardiovascular:Negative CV ROS                      Neuro/Psych:   Negative Neuro/Psych ROS              GI/Hepatic/Renal: Neg GI/Hepatic/Renal ROS            Endo/Other: Negative Endo/Other ROS                    Abdominal:             Vascular: negative vascular ROS. Other Findings:             Anesthesia Plan      general and TIVA     ASA 1       Induction: intravenous. Anesthetic plan and risks discussed with patient.       Plan discussed with surgical team.                  MURRAY Main - CRNA   7/23/2021

## 2021-07-27 LAB — SURGICAL PATHOLOGY REPORT: NORMAL

## 2022-02-28 ENCOUNTER — OFFICE VISIT (OUTPATIENT)
Dept: OBGYN CLINIC | Age: 57
End: 2022-02-28
Payer: COMMERCIAL

## 2022-02-28 VITALS
SYSTOLIC BLOOD PRESSURE: 126 MMHG | HEIGHT: 63 IN | DIASTOLIC BLOOD PRESSURE: 88 MMHG | BODY MASS INDEX: 35.79 KG/M2 | WEIGHT: 202 LBS

## 2022-02-28 DIAGNOSIS — N64.4 BREAST PAIN, LEFT: Primary | ICD-10-CM

## 2022-02-28 PROCEDURE — 99213 OFFICE O/P EST LOW 20 MIN: CPT | Performed by: ADVANCED PRACTICE MIDWIFE

## 2022-02-28 ASSESSMENT — ENCOUNTER SYMPTOMS
GASTROINTESTINAL NEGATIVE: 1
RESPIRATORY NEGATIVE: 1

## 2022-02-28 NOTE — PROGRESS NOTES
PROBLEM VISIT     Date of service: 2022    Delroy Sutton  Is a 61 y.o.  female    PT's PCP is: I[facility] Fox     : 1965                                          Review of Systems   Constitutional: Negative. HENT: Negative. Respiratory: Negative. Cardiovascular: Negative. Gastrointestinal: Negative. Genitourinary:        Left breast pain   Neurological: Negative. Psychiatric/Behavioral: Negative. No LMP recorded. OB History    Para Term  AB Living   2             SAB IAB Ectopic Molar Multiple Live Births             2      # Outcome Date GA Lbr Raciel/2nd Weight Sex Delivery Anes PTL Lv   2             1                  Social History     Tobacco Use   Smoking Status Never Smoker   Smokeless Tobacco Never Used        Social History     Substance and Sexual Activity   Alcohol Use Yes    Alcohol/week: 5.0 standard drinks    Types: 5 Glasses of wine per week       Allergies: Tree nut [macadamia nut oil]      Current Outpatient Medications:     ketorolac (TORADOL) 10 MG tablet, Take 1 tablet by mouth every 6 hours as needed for Pain, Disp: 12 tablet, Rfl: 0    Social History     Substance and Sexual Activity   Sexual Activity Yes    Partners: Male       Chief Complaint   Patient presents with    Breast Problem     Mentions that left breast was having some tenderness and sometimes she would hit it just the right way and it would feel like a sharper pain. States less bothersome now. No recent Mamm. Physical Exam  Constitutional:       Appearance: Normal appearance. She is obese. Genitourinary:   Breasts:      Breasts are soft. Left: Tenderness (outer aspect into nipple) present. No inverted nipple, mass, nipple discharge or skin change. Breast exam comments: Bilateral pendulous breast tissue that is dense. HENT:      Head: Normocephalic. Eyes:      Pupils: Pupils are equal, round, and reactive to light.    Pulmonary: Effort: Pulmonary effort is normal.   Musculoskeletal:         General: Normal range of motion. Cervical back: Normal range of motion. Neurological:      Mental Status: She is alert and oriented to person, place, and time. Skin:     General: Skin is warm. Psychiatric:         Behavior: Behavior normal.   Vitals and nursing note reviewed. Vital Signs  Blood pressure 126/88, height 5' 3\" (1.6 m), weight 202 lb (91.6 kg). HPI Here for eval of left breast pain. Onset approx 2 weeks. Reports \"maybe just bruised it or irritated a muscle working out now as it is getting better\". Denies drainage, nipple changes, dimpling, bruise that was visible. Overdue for mammogram.                                Assessment and Plan          Diagnosis Orders   1. Breast pain, left  [facility] W OR WO CAD BILATERAL       Discussed imaging - dx mammogram - desires to be done at [facility] - order faxed by Mindy Castillo MA      I am having Brain Kelp \"Guido\" maintain her ketorolac. Return for Yearly. She was also counseled on her preventative health maintenance recommendations and follow-up. There are no Patient Instructions on file for this visit.     MURRAY Teresa CNM,2/28/2022 12:59 PM                   Electronically signed by MURRAY Teresa CNM

## 2022-03-04 DIAGNOSIS — N64.4 BREAST PAIN, LEFT: ICD-10-CM

## 2022-03-07 NOTE — PROGRESS NOTES
PROBLEM VISIT     Date of service: 2021    Sharon Scheuermann  Is a 63 y.o.  female    PT's PCP is: No primary care provider on file. : 1965                                          Review of Systems   Constitutional: Negative. HENT: Negative. Respiratory: Negative. Cardiovascular: Negative. Gastrointestinal: Positive for abdominal pain (low abd cramping). Genitourinary: Positive for menstrual problem (AUB - very heavy, cramping, some clots) and pelvic pain. Musculoskeletal: Positive for back pain (right sided mid to low back pain). Neurological: Negative. Negative for dizziness and light-headedness. No LMP recorded. OB History    Para Term  AB Living   2             SAB TAB Ectopic Molar Multiple Live Births             2      # Outcome Date GA Lbr Raciel/2nd Weight Sex Delivery Anes PTL Lv   2             1                  Social History     Tobacco Use   Smoking Status Never Smoker   Smokeless Tobacco Never Used        Social History     Substance and Sexual Activity   Alcohol Use Yes    Alcohol/week: 5.0 standard drinks    Types: 5 Glasses of wine per week       Allergies: Patient has no known allergies. No current outpatient medications on file. Social History     Substance and Sexual Activity   Sexual Activity Yes    Partners: Male       Chief Complaint   Patient presents with    Vaginal Bleeding     Pt c/o heavy bleeding for past three days and using 2-3 pads in an hour. Pt c/o gushing blood at times. Pt states LMP was 2020 and has not had one in a year then had bleeding this weekend. Physical Exam  Constitutional:       Appearance: Normal appearance. She is obese. Genitourinary:      Pelvic exam was performed with patient in the lithotomy position. Vulva, urethra and bladder normal.      Cervical bleeding present. No cervical motion tenderness or polyp. Uterus is enlarged.       Uterus is not tender. Right (Due to body habitus) and left (Due to body habitus) adnexa are non-palpable. Genitourinary Comments: Bleeding scant to minimal with examination - easily cleared from cervical opening with scopette prior to pap smear being obtained. HENT:      Head: Normocephalic. Eyes:      Pupils: Pupils are equal, round, and reactive to light. Neck:      Musculoskeletal: Normal range of motion. Cardiovascular:      Rate and Rhythm: Normal rate and regular rhythm. Pulses: Normal pulses. Heart sounds: Normal heart sounds. No murmur. Pulmonary:      Effort: Pulmonary effort is normal.      Breath sounds: Normal breath sounds. No wheezing. Abdominal:      General: Bowel sounds are normal. There is no distension. Palpations: Abdomen is soft. Tenderness: There is no abdominal tenderness. There is no guarding or rebound. Musculoskeletal: Normal range of motion. Comments: CVA tenderness not present   Neurological:      Mental Status: She is alert and oriented to person, place, and time. Skin:     General: Skin is warm and dry. Psychiatric:         Behavior: Behavior normal.   Vitals signs and nursing note reviewed. Chaperone present: Declined. Vital Signs  Blood pressure (!) 140/80, height 5' 2\" (1.575 m), weight 199 lb (90.3 kg). HPI Here for first time since 2019. Last pap was 2017. Reports since 2019 has had perimenopausal type s/s and approx 4 episodes of very light spotting - \"sometimes just toilet paper and others needed a liner\". However, 4/23/21 started similar but 4/24 into 4/25 bleeding significantly increased - very heavy, soaking through tampon and pad in 2-3 hours, cramping, small inconsistent clots present. Denies any s/s of syncope. Having some dysmenorrhea and right mid to low back pain.         EMB: performed today - procedure explained to pt - verbal consent obtained, pregnancy test declined    - large speculum paced in vaginal canal - cervix visualized  - cervix prepped with betadine solution  - hurricane spray  was not used  - tenaculum was not used  - os finder used and uterus sounded to 9cm  - pipette used to obtain specimen  - pt tolerated procedure well. Assessment and Plan          Diagnosis Orders   1. Abnormal uterine bleeding  CBC Auto Differential    Follicle Stimulating Hormone    Estradiol    PAP SMEAR    Surgical Pathology   2. Episode of heavy vaginal bleeding       Discussed concerns with patient - unsure if this is postmenopausal bleeding (need to obtain [facility] and estradiol level today) or AUB of another nature. Since not seen since 2019 and no pap since 2017 patient was agreeable to pap today with endometrial biopsy. Discussed concerns that if PMB need imaging and bx results prior to next steps which may be surgical.  Will rule out anemia today due to heaviness of bleeding with CBC. Consulted Dr Linwood August - she is agreeable with Aygestin regimen for patient for heavy bleeding. Aware of s/s to go to ER for. Inga Villanueva does not currently have medications on file. Return in about 1 day (around 4/27/2021) for Pelvic USN, Gyn f/u. She was also counseled on her preventative health maintenance recommendations and follow-up. There are no Patient Instructions on file for this visit.     ELADIA CUMMINGS,4/26/2021 1:12 PM                   Electronically signed by MURRAY Almaraz CNM Left arm;

## (undated) DEVICE — PACK,LITHOTOMY: Brand: MEDLINE

## (undated) DEVICE — COVER LT HNDL BLU PLAS

## (undated) DEVICE — Z DISCONTINUED NO SUB IDED DEVICE ABLAT ENDOMET UTER SOUNDING ES SURESOUND NOVASURE

## (undated) DEVICE — CATHETER URETH 16FR L16IN RED RUB INTMIT ROB MOD BARDX

## (undated) DEVICE — CHLORAPREP 26ML ORANGE

## (undated) DEVICE — PAD N ADH W3XL4IN POLY COT SFT PERF FLM EASILY CUT ABSRB

## (undated) DEVICE — SOLUTION IV 1000ML 0.9% SOD CHL FOR IRRIG PLAS CONT

## (undated) DEVICE — TOWEL,OR,DSP,ST,BLUE,STD,4/PK,20PK/CS: Brand: MEDLINE

## (undated) DEVICE — UNDERPANTS INCONT XL 45-70IN KNIT SEAMLESS DSGN COLOR-CODED

## (undated) DEVICE — DRAPE SURG LITH HYSTSCP D AND C STD N FEN N REINF W FLD PCH

## (undated) DEVICE — SUTURE VCRL SZ 2-0 L27IN ABSRB VLT L26MM UR-6 5/8 CIR J602H

## (undated) DEVICE — GAUZE,SPONGE,4"X4",16PLY,XRAY,STRL,LF: Brand: MEDLINE

## (undated) DEVICE — 1000ML,PRESSURE INFUSER W/STOPCOCK: Brand: MEDLINE

## (undated) DEVICE — SOLUTION IV IRRIG POUR BRL 0.9% SODIUM CHL 2F7124

## (undated) DEVICE — GLOVE SURG SZ 65 L12IN FNGR THK87MIL WHT LTX FREE

## (undated) DEVICE — PREP PAD BNS: Brand: CONVERTORS

## (undated) DEVICE — Y-TYPE TUR/BLADDER IRRIGATION SET, REGULATING CLAMP

## (undated) DEVICE — SOLUTION SURG PREP ANTIMICROBIAL 4 OZ SKIN WND EXIDINE

## (undated) DEVICE — GOWN,SIRUS,POLYRNF,BRTHSLV,XL,30/CS: Brand: MEDLINE

## (undated) DEVICE — PAD,ABDOMINAL,8"X10",ST,LF: Brand: MEDLINE

## (undated) DEVICE — Z DISCONTINUED BY MEDLINE USE 2711682 TRAY SKIN PREP DRY W/ PREM GLV